# Patient Record
Sex: FEMALE | HISPANIC OR LATINO | URBAN - METROPOLITAN AREA
[De-identification: names, ages, dates, MRNs, and addresses within clinical notes are randomized per-mention and may not be internally consistent; named-entity substitution may affect disease eponyms.]

---

## 2021-04-23 ENCOUNTER — OFFICE VISIT (OUTPATIENT)
Dept: URBAN - METROPOLITAN AREA CLINIC 82 | Facility: CLINIC | Age: 51
End: 2021-04-23
Payer: COMMERCIAL

## 2021-04-23 ENCOUNTER — DASHBOARD ENCOUNTERS (OUTPATIENT)
Age: 51
End: 2021-04-23

## 2021-04-23 VITALS
RESPIRATION RATE: 16 BRPM | HEIGHT: 61 IN | TEMPERATURE: 97.4 F | DIASTOLIC BLOOD PRESSURE: 79 MMHG | WEIGHT: 143 LBS | SYSTOLIC BLOOD PRESSURE: 125 MMHG | BODY MASS INDEX: 27 KG/M2 | HEART RATE: 67 BPM

## 2021-04-23 DIAGNOSIS — K59.00 CONSTIPATION, UNSPECIFIED CONSTIPATION TYPE: ICD-10-CM

## 2021-04-23 DIAGNOSIS — Z90.49 HX OF CHOLECYSTECTOMY: ICD-10-CM

## 2021-04-23 PROBLEM — 14760008: Status: ACTIVE | Noted: 2021-04-23

## 2021-04-23 PROBLEM — 428882003: Status: ACTIVE | Noted: 2021-04-23

## 2021-04-23 PROCEDURE — 99203 OFFICE O/P NEW LOW 30 MIN: CPT | Performed by: INTERNAL MEDICINE

## 2021-04-23 NOTE — HPI-TODAY'S VISIT:
The patient was referred by Dr. Carter for Screening colonoscopy .   A copy of this document is being forwarded to the referring provider.

## 2021-04-23 NOTE — HPI-OTHER HISTORIES
51 y/o  female  with mhx of cholecystectomy, thyroid nodules s/p bx with bening findings, microscopic hematuria s/p cystoscopy with bening findings that came for eval. She was referred by Dr. Carter. She had a colonoscopy with Ottoniel 2018 report is not available, but she say it was normal.She complaints of chronic constipation but she did not drink enough water, do exercise or eat fiber on diet. Denies GI bleeding or abdominal pain today or recently. No toxic habits.No fam hx of colorectal cancer. 4 C-sections and cholecystectomy so she may has adhesions contributing to her constipation.

## 2021-11-29 ENCOUNTER — OFFICE VISIT (OUTPATIENT)
Dept: URBAN - METROPOLITAN AREA CLINIC 82 | Facility: CLINIC | Age: 51
End: 2021-11-29